# Patient Record
Sex: FEMALE
[De-identification: names, ages, dates, MRNs, and addresses within clinical notes are randomized per-mention and may not be internally consistent; named-entity substitution may affect disease eponyms.]

---

## 2017-05-02 ENCOUNTER — HOSPITAL ENCOUNTER (EMERGENCY)
Dept: HOSPITAL 14 - H.ER | Age: 22
Discharge: HOME | End: 2017-05-02
Payer: MEDICAID

## 2017-05-02 VITALS
TEMPERATURE: 98.9 F | HEART RATE: 94 BPM | DIASTOLIC BLOOD PRESSURE: 69 MMHG | SYSTOLIC BLOOD PRESSURE: 137 MMHG | OXYGEN SATURATION: 98 %

## 2017-05-02 VITALS — BODY MASS INDEX: 38.2 KG/M2

## 2017-05-02 VITALS — RESPIRATION RATE: 18 BRPM

## 2017-05-02 DIAGNOSIS — N76.0: Primary | ICD-10-CM

## 2017-05-02 NOTE — ED PDOC
HPI: Female  Pain


Time Seen by Provider: 17 10:06


Chief Complaint (Nursing): Female Genitourinary


Chief Complaint (Provider): vaginal discomfort


History Per: Patient


Additional Complaint(s): 


21-year-old female presents to emergency department with external vaginal 

discomfort 2 days. Patient has generalized itchiness externally and denies any 

discharge. Patient states she is currently sexually active with one partner and 

always uses protection. She denies any concern for STDs. No fever or chills. 

Patient denies dysuria, hematuria, frequency or urgency.





Past Medical History


Reviewed: Historical Data, Nursing Documentation, Vital Signs


Vital Signs: 





 Last Vital Signs











Temp  98.9 F   17 10:13


 


Pulse  94 H  17 10:13


 


Resp  18   17 10:13


 


BP  137/69   17 10:13


 


Pulse Ox  98   17 10:13














- Medical History


PMH: Asthma





- Surgical History


Surgical History: Tonsillectomy





- Family History


Family History: States: No Known Family Hx





- Living Arrangements


Living Arrangements: With Family





- Social History


Current smoker - smoking cessation education provided: No


Alcohol: None


Drugs: Denies





- Home Medications


Home Medications: 


 Ambulatory Orders











 Medication  Instructions  Recorded


 


Fluconazole [Diflucan] 150 mg PO ONCE #1 tab 17


 


Metronidazole [Metrogel] 60 gm VAG HS #1 packet 17














- Allergies


Allergies/Adverse Reactions: 


 Allergies











Allergy/AdvReac Type Severity Reaction Status Date / Time


 


No Known Allergies Allergy   Verified 17 10:13














Review of Systems


ROS Statement: Except As Marked, All Systems Reviewed And Found Negative


Constitutional: Negative for: Fever


Respiratory: Negative for: Cough


Gastrointestinal: Negative for: Abdominal Pain


Genitourinary Female: Positive for: Other (external vaginal itching )





Physical Exam





- Reviewed


Nursing Documentation Reviewed: Yes


Vital Signs Reviewed: Yes





- Physical Exam


Appears: Positive for: Well, Non-toxic, No Acute Distress


Skin: Negative for: Rash


Eye Exam: Positive for: Normal appearance, EOMI, PERRL


Cardiovascular/Chest: Positive for: Regular Rate, Rhythm


Respiratory: Positive for: Normal Breath Sounds


Pelvic Exam: Positive for: Other (Slight erythema noted to labia majora 

bilaterally, no lesions or rash, no active discharge noted)


Back: Negative for: L CVA Tenderness, R CVA Tenderness


Extremity: Negative for: Pedal Edema


Neurologic/Psych: Positive for: Alert, Oriented





- Laboratory Results


Urine Pregnancy POC: Negative


Urine dip results: Positive for: Leukocyte Esterase (trace), Blood (moderate).  

Negative for: Nitrate, Ketones, Glucose, Bilirubin, Protein





- ECG


O2 Sat by Pulse Oximetry: 98


Pulse Ox Interpretation: Normal





Medical Decision Making


Medical Decision Makin year old with external vaginal discomfort. 





Plan:


Urine dip


Pregnancy test


Urine culture


CHL/GC cultures





Patient will be treated with a prescription for Diflucan and MetroGel. Cultures 

were sent. Patient was advised to take meds as directed and was referred to 

women's clinic for follow-up.





Disposition





- Clinical Impression


Clinical Impression: 


 Vaginal pruritus, Vaginitis








- Patient ED Disposition


Is Patient to be Admitted: No


Counseled Patient/Family Regarding: Diagnosis, Need For Followup, Rx Given





- Disposition


[facility]errals[facility] 


[facility] [[facility]


Disposition: Routine/Home


Disposition Time: 10:40


Condition: STABLE


Additional Instructions: 


Take prescription meds as directed. Follow-up with private gynecologist for 

women's clinic.


Prescriptions: 


Fluconazole [Diflucan] 150 mg PO ONCE #1 tab


Metronidazole [Metrogel] 60 gm VAG HS #1 packet


Instructions:  Vaginitis (ED)

## 2017-05-17 ENCOUNTER — HOSPITAL ENCOUNTER (EMERGENCY)
Dept: HOSPITAL 14 - H.ER | Age: 22
Discharge: HOME | End: 2017-05-17
Payer: MEDICAID

## 2017-05-17 VITALS
DIASTOLIC BLOOD PRESSURE: 87 MMHG | OXYGEN SATURATION: 99 % | RESPIRATION RATE: 20 BRPM | TEMPERATURE: 98.3 F | HEART RATE: 107 BPM | SYSTOLIC BLOOD PRESSURE: 149 MMHG

## 2017-05-17 VITALS — BODY MASS INDEX: 31.7 KG/M2

## 2017-05-17 DIAGNOSIS — Z71.6: ICD-10-CM

## 2017-05-17 DIAGNOSIS — J02.9: Primary | ICD-10-CM

## 2017-05-17 DIAGNOSIS — J06.9: ICD-10-CM

## 2017-05-17 DIAGNOSIS — J45.909: ICD-10-CM

## 2017-05-17 NOTE — ED PDOC
HPI: CCC, URI, Sore Throat


Time Seen by Provider: 05/17/17 11:52


Chief Complaint (Provider): Sore throat


Additional Complaint(s): 





21 yo female, no PMH, presents to ED with complaints of sore throat and dry 

cough since Sunday night. No fever or chills. no nausea or vomiting.





Past Medical History


Reviewed: Historical Data, Nursing Documentation, Vital Signs


Vital Signs: 


 Last Vital Signs











Temp  98.3 F   05/17/17 11:55


 


Pulse  107 H  05/17/17 11:55


 


Resp  20   05/17/17 11:55


 


BP  149/87   05/17/17 11:55


 


Pulse Ox  99   05/17/17 12:06














- Medical History


PMH: Asthma





- Surgical History


Surgical History: Tonsillectomy





- Family History


Family History: States: No Known Family Hx





- Living Arrangements


Living Arrangements: With Family





- Social History


Current smoker - smoking cessation education provided: No





- Immunization History


Hx Tetanus Toxoid Vaccination: No





- Home Medications


Home Medications: 


 Ambulatory Orders











 Medication  Instructions  Recorded


 


Fluconazole [Diflucan] 150 mg PO ONCE #1 tab 05/02/17


 


Metronidazole [Metrogel] 60 gm VAG HS #1 packet 05/02/17


 


Guaifenesin/Pseudoephedrne HCl 1 tab PO DAILY PRN #30 ter 05/17/17





[Mucinex D 600 mg-60 mg]  


 


Ibuprofen [Motrin] 600 mg PO Q6 #20 tab 05/17/17














- Allergies


Allergies/Adverse Reactions: 


 Allergies











Allergy/AdvReac Type Severity Reaction Status Date / Time


 


Pineapple Allergy  RASH Uncoded 05/17/17 12:00














Review of Systems


ROS Statement: Except As Marked, All Systems Reviewed And Found Negative


ENT: Positive for: Throat Pain





Physical Exam





- Reviewed


Nursing Documentation Reviewed: Yes


Vital Signs Reviewed: Yes





- Physical Exam


Appears: Positive for: Well, Non-toxic, No Acute Distress


Head Exam: Positive for: ATRAUMATIC, NORMAL INSPECTION, NORMOCEPHALIC


Skin: Positive for: Normal Color, Warm, DRY


Eye Exam: Positive for: EOMI, Normal appearance, PERRL


ENT: Positive for: TM Is/Are (WNL), Pharyngeal Erythema.  Negative for: 

Tonsillar Exudate, Tonsillar Swelling


Neck: Positive for: Normal, Painless ROM


Cardiovascular/Chest: Positive for: Regular Rate, Rhythm


Respiratory: Positive for: CNT, Normal Breath Sounds


Gastrointestinal/Abdominal: Positive for: Normal Exam, Bowel Sounds, Soft


Back: Positive for: Normal Inspection


Extremity: Positive for: Normal ROM


Neurologic/Psych: Positive for: Alert, Oriented





- ECG


O2 Sat by Pulse Oximetry: 99





Medical Decision Making


Medical Decision Making: 





Strep (-)





Disposition





- Clinical Impression


Clinical Impression: 


 Pharyngitis, Upper respiratory infection








- Patient ED Disposition


Is Patient to be Admitted: No





- Disposition


Disposition: Routine/Home


Disposition Time: 14:07


Condition: STABLE


Prescriptions: 


Guaifenesin/Pseudoephedrne HCl [Mucinex D 600 mg-60 mg] 1 tab PO DAILY PRN #30 

ter


 PRN Reason: congestion


Ibuprofen [Motrin] 600 mg PO Q6 #20 tab


Instructions:  Pharyngitis (ED), Upper Respiratory Infection (ED)





- POA


Present On Arrival: None

## 2017-10-03 ENCOUNTER — HOSPITAL ENCOUNTER (EMERGENCY)
Dept: HOSPITAL 14 - H.ER | Age: 22
Discharge: HOME | End: 2017-10-03
Payer: MEDICAID

## 2017-10-03 VITALS — BODY MASS INDEX: 31.7 KG/M2

## 2017-10-03 VITALS
RESPIRATION RATE: 16 BRPM | OXYGEN SATURATION: 100 % | DIASTOLIC BLOOD PRESSURE: 72 MMHG | TEMPERATURE: 98.6 F | SYSTOLIC BLOOD PRESSURE: 105 MMHG | HEART RATE: 101 BPM

## 2017-10-03 DIAGNOSIS — H60.92: Primary | ICD-10-CM

## 2017-10-03 NOTE — ED PDOC
HPI: CCC, URI, Sore Throat


Time Seen by Provider: 10/03/17 14:40


Chief Complaint (Nursing): ENT Problem


Chief Complaint (Provider): Left ear pain


History Per: Patient


History/Exam Limitations: no limitations


Have you had recent travel within the past 21 days to any of the following 

countries: [place], [place], [place] or [place]?: No


Onset/Duration Of Symptoms: Days


Current Symptoms Are (Timing): Still Present


Location Of Pain: Ear(s)


Sick Contacts (Context): None


Additional History Per: Patient


Additional Complaint(s): 


Patient is a 23 y/o female with history of asthma who presents to the ED for 

evaluation of left ear pain. Patient reports sensation of liquid in her ear but 

denies any drainage or bleeding. Patient denies any associated throat pain, 

cough, congestion. She denies pain in her right ear and offers no other medical 

complaints. No hearing loss or change. 








Past Medical History


Reviewed: Historical Data, Nursing Documentation, Vital Signs


Vital Signs: 


 Last Vital Signs











Temp  98.6 F   10/03/17 14:33


 


Pulse  101 H  10/03/17 14:33


 


Resp  16   10/03/17 14:33


 


BP  105/72   10/03/17 14:33


 


Pulse Ox  100   10/03/17 14:50














- Medical History


PMH: Asthma





- Surgical History


Surgical History: Tonsillectomy





- Family History


Family History: States: No Known Family Hx





- Living Arrangements


Living Arrangements: With Family





- Social History


Current smoker - smoking cessation education provided: No


Ex-Smoker (has not smoked in the last 12 months): No


Alcohol: None





- Immunization History


Hx Tetanus Toxoid Vaccination: No





- Home Medications


Home Medications: 


 Ambulatory Orders











 Medication  Instructions  Recorded


 


Fluconazole [Diflucan] 150 mg PO ONCE #1 tab 05/02/17


 


Metronidazole [Metrogel] 60 gm VAG HS #1 packet 05/02/17


 


Guaifenesin/Pseudoephedrne HCl 1 tab PO DAILY PRN #30 ter 05/17/17





[Mucinex D 600 mg-60 mg]  


 


Ibuprofen [Motrin] 600 mg PO Q6 #20 tab 05/17/17


 


Neomycin/Polymyxin/Hydrocort 4 drop TOP BID #1 bottle 10/03/17





[Cortisporin Otic Soln]  














- Allergies


Allergies/Adverse Reactions: 


 Allergies











Allergy/AdvReac Type Severity Reaction Status Date / Time


 


P[place]apple Allergy  RASH Uncoded 10/03/17 14:32














Review of Systems


ROS Statement: Except As Marked, All Systems Reviewed And Found Negative


Constitutional: Negative for: Fever


ENT: Positive for: Ear Pain (left ear pain).  Negative for: Ear Discharge, Nose 

Congestion, Throat Pain


Respiratory: Negative for: Cough


Neurological: Negative for: Headache, Dizziness





Physical Exam





- Reviewed


Nursing Documentation Reviewed: Yes


Vital Signs Reviewed: Yes





- Physical Exam


Appears: Positive for: Non-toxic, No Acute Distress


Head Exam: Positive for: ATRAUMATIC


Skin: Positive for: Warm, Dry


Eye Exam: Positive for: Normal appearance


ENT: Positive for: TM Is/Are (right TM normal, left TM with poor visualization 

due to edema and erythema in left ear canal. no drainage noted.).  Negative for

: Pharyngeal Erythema, Tonsillar Exudate, Tonsillar Swelling


Neck: Positive for: Supple


Cardiovascular/Chest: Positive for: Regular Rate, Rhythm


Respiratory: Positive for: Normal Breath Sounds.  Negative for: Respiratory 

Distress


Neurologic/Psych: Positive for: Alert, Oriented.  Negative for: Motor/Sensory 

Deficits





- Laboratory Results


Urine Pregnancy POC: Negative





- ECG


O2 Sat by Pulse Oximetry: 100 (RA)


Pulse Ox Interpretation: Normal





Medical Decision Making


Medical Decision Making: 


Time: 1443





Impression: Otitis externa





Plan:


-- UPreg


-- Motrin 600 mg PO





Patient to be discharged home with prescriptions for cortisporin ear drops as 

well as referral to an ENT specialist for a follow up within the next week.





Scribe Attestation:


Documented by Cara Moser acting as a scribe for ALVIN De León


Provider Attestation:


All medical record entries made by the Scribe were at my direction and 

personally dictated by me. I have reviewed the chart and agree that the record 

accurately reflects my personal performance of the history, physical exam, 

medical decision making, and the department course for this patient. I have 

also personally directed, reviewed, and agree with the discharge instructions 

and disposition.








Disposition





- Clinical Impression


Clinical Impression: 


 Otitis externa








- Patient ED Disposition


Is Patient to be Admitted: No


Counseled Patient/Family Regarding: Diagnosis, Need For Followup, Rx Given





- Disposition


Referrals: 


Behin,Babak, MD [Staff Provider] - 


Disposition: Routine/Home


Disposition Time: 14:57


Condition: STABLE


Additional Instructions: 


Take prescription as directed. Over-the-counter Advil for pain as needed. 

Follow up with ear, nose and throat specialist for any persistent symptoms.


Prescriptions: 


Neomycin/Polymyxin/Hydrocort [Cortisporin Otic Soln] 4 drop TOP BID #1 bottle


Instructions:  Otitis Externa (ED)


Forms:  [facility] (English)

## 2017-11-14 ENCOUNTER — HOSPITAL ENCOUNTER (EMERGENCY)
Dept: HOSPITAL 14 - H.ER | Age: 22
Discharge: HOME | End: 2017-11-14
Payer: MEDICAID

## 2017-11-14 VITALS — RESPIRATION RATE: 20 BRPM

## 2017-11-14 VITALS
TEMPERATURE: 99 F | DIASTOLIC BLOOD PRESSURE: 80 MMHG | SYSTOLIC BLOOD PRESSURE: 141 MMHG | HEART RATE: 96 BPM | OXYGEN SATURATION: 99 %

## 2017-11-14 VITALS — BODY MASS INDEX: 37.5 KG/M2

## 2017-11-14 DIAGNOSIS — J45.909: ICD-10-CM

## 2017-11-14 DIAGNOSIS — J06.9: Primary | ICD-10-CM

## 2017-11-14 NOTE — ED PDOC
HPI: CCC, URI, Sore Throat


Time Seen by Provider: 11/14/17 12:14


Chief Complaint (Nursing): Cough, Cold, Congestion


Chief Complaint (Provider): Cough, Congestion


History Per: Patient


History/Exam Limitations: no limitations


Onset/Duration Of Symptoms: Days (x5)


Current Symptoms Are (Timing): Still Present


Additional Complaint(s): 





Qi Le is a 22 year old female with a past medical history of asthma 

who presents to the ED complaining of a dry cough x5 days. Patient states the 

cough began Friday and attributed it to her asthma, leading her to use her 

inhaler. States cough intensified in the afternoon and she took cough medicine 

with no relief. Confirms chest tightness, nausea, subjective fever, and nasal 

congestion. Denies vomiting. 





PMD: Non-[facility] Provider








Past Medical History


Reviewed: Historical Data, Nursing Documentation, Vital Signs


Vital Signs: 


 Last Vital Signs











Temp  99 F   11/14/17 12:12


 


Pulse  96 H  11/14/17 12:12


 


Resp  20   11/14/17 12:12


 


BP  141/80   11/14/17 12:12


 


Pulse Ox  99   11/14/17 15:42














- Medical History


PMH: Asthma


   Denies: Chronic Kidney Disease





- Surgical History


Surgical History: Tonsillectomy





- Family History


Family History: States: Unknown Family Hx





- Social History


Current smoker - smoking cessation education provided: Yes





- Immunization History


Hx Tetanus Toxoid Vaccination: No





- Home Medications


Home Medications: 


 Ambulatory Orders











 Medication  Instructions  Recorded


 


Neomycin/Polymyxin/Hydrocort 4 drop TOP BID #1 bottle 10/03/17





[Cortisporin Otic Soln]  


 


Promethazine/Codeine 5 ml PO DAILY PRN #100 ml 11/14/17





[Codeine/Promethazine 10 MG/5  





Ml-6.25 MG/5 Ml]  


 


Pseudoephedrine [Sudafed Tab] 60 mg PO Q6 PRN #24 tab 11/14/17














- Allergies


Allergies/Adverse Reactions: 


 Allergies











Allergy/AdvReac Type Severity Reaction Status Date / Time


 


Pineapple Allergy  RASH Uncoded 10/03/17 14:32














Review of Systems


ROS Statement: Except As Marked, All Systems Reviewed And Found Negative


Constitutional: Positive for: Fever (subjective)


ENT: Positive for: Nose Congestion


Respiratory: Positive for: Cough.  Negative for: Sputum


Gastrointestinal: Positive for: Nausea.  Negative for: Vomiting





Physical Exam





- Reviewed


Nursing Documentation Reviewed: Yes


Vital Signs Reviewed: Yes





- Physical Exam


Appears: Positive for: Well, Non-toxic, No Acute Distress


Head Exam: Positive for: ATRAUMATIC, NORMAL INSPECTION, NORMOCEPHALIC


Skin: Positive for: Normal Color.  Negative for: Rash


Eye Exam: Positive for: Normal appearance


ENT: Positive for: Nasal Congestion


Respiratory: Positive for: Normal Breath Sounds (Clear)


Neurologic/Psych: Positive for: Alert, Oriented





- ECG


O2 Sat by Pulse Oximetry: 99 (RA)


Pulse Ox Interpretation: Normal





Medical Decision Making


Medical Decision Making: 





Time: 12:22


Clinical Impression: URI


Plan:


Upon provider evaluation patient is medically stable, and requires no further 

treatment in the ED at this time. Patient will be discharged home with Rx for 

Sudafed and Codeine/Promethazine. Counseling was provided and all questions 

were answered regarding diagnosis and need for follow up with PMD. There is 

agreement to discharge plan. Return if symptoms persist or worsen.











--------------------------------------------------------------------------------

-----------------


Scribe Attestation:   


Documented by Ollie Goldsmith, acting as a scribe for Anshul Fleming PA-C





Provider Scribe Attestation:


All medical record entries made by the Scribe were at my direction and 

personally dictated by me. I have reviewed the chart and agree that the record 

accurately reflects my personal performance of the history, physical exam, 

medical decision making, and the department course for this patient. I have 

also personally directed, reviewed, and agree with the discharge instructions 

and disposition.





Disposition





- Clinical Impression


Clinical Impression: 


 URI (upper respiratory infection)








- Patient ED Disposition


Is Patient to be Admitted: No


Counseled Patient/Family Regarding: Diagnosis, Need For Followup, Rx Given





- Disposition


Disposition: Routine/Home


Disposition Time: 12:22


Condition: FAIR


Prescriptions: 


Promethazine/Codeine [Codeine/Promethazine 10 MG/5 Ml-6.25 MG/5 Ml] 5 ml PO 

DAILY PRN #100 ml


 PRN Reason: Cough


Pseudoephedrine [Sudafed Tab] 60 mg PO Q6 PRN #24 tab


 PRN Reason: Nasal Congestion


Instructions:  Upper Respiratory Infection (ED)


Forms:  [facility] (English), [facility] ED School/Work Excuse

## 2018-01-08 ENCOUNTER — HOSPITAL ENCOUNTER (EMERGENCY)
Dept: HOSPITAL 14 - H.ER | Age: 23
Discharge: HOME | End: 2018-01-08
Payer: SELF-PAY

## 2018-01-08 VITALS
SYSTOLIC BLOOD PRESSURE: 160 MMHG | HEART RATE: 112 BPM | OXYGEN SATURATION: 100 % | TEMPERATURE: 97.8 F | RESPIRATION RATE: 16 BRPM | DIASTOLIC BLOOD PRESSURE: 66 MMHG

## 2018-01-08 VITALS — BODY MASS INDEX: 37.5 KG/M2

## 2018-01-08 DIAGNOSIS — J40: Primary | ICD-10-CM

## 2018-01-08 NOTE — ED PDOC
HPI: CCC, URI, Sore Throat


Time Seen by Provider: 01/08/18 13:00


Chief Complaint (Nursing): Cough, Cold, Congestion


Chief Complaint (Provider): Cough


History Per: Patient


History/Exam Limitations: no limitations


Onset/Duration Of Symptoms: Other (3 weeks)


Additional Complaint(s): 





Patient is a 23 y/o female with a past medical history of asthma presenting to 

the emergency department for a productive cough ongoing for three weeks with 

associated occasional shortness of breath. Reports taking Dayquil and Nyquil 

without any significant relief. Denies having an inhaler at home. Reports 

having multiple ill contacts at home (including mom and nephew). Denies fever 

or other complaints.





PCP: none provided.








Past Medical History


Reviewed: Historical Data, Nursing Documentation, Vital Signs


Vital Signs: 


 Last Vital Signs











Temp  97.8 F   01/08/18 12:24


 


Pulse  112 H  01/08/18 12:24


 


Resp  16   01/08/18 12:24


 


BP  160/66 H  01/08/18 12:24


 


Pulse Ox  100   01/08/18 13:20














- Medical History


PMH: Asthma


   Denies: Chronic Kidney Disease





- Surgical History


Surgical History: Tonsillectomy





- Family History


Family History: States: Unknown Family Hx





- Immunization History


Hx Tetanus Toxoid Vaccination: No





- Home Medications


Home Medications: 


 Ambulatory Orders











 Medication  Instructions  Recorded


 


Neomycin/Polymyxin/Hydrocort 4 drop TOP BID #1 bottle 10/03/17





[Cortisporin Otic Soln]  


 


Promethazine/Codeine 5 ml PO DAILY PRN #100 ml 11/14/17





[Codeine/Promethazine 10 MG/5  





Ml-6.25 MG/5 Ml]  


 


Pseudoephedrine [Sudafed Tab] 60 mg PO Q6 PRN #24 tab 11/14/17


 


Albuterol HFA [Ventolin HFA 90 2 puff IH [id number] PRN #1 inh 01/08/18





mcg/actuation (8 g)]  


 


Prednisone [Deltasone] 2 tab PO DAILY #10 tablet 01/08/18


 


Promethazine/Codeine 5 ml PO Q12 PRN #100 ml 01/08/18





[Codeine/Promethazine 10 MG/5  





Ml-6.25 MG/5 Ml]  














- Allergies


Allergies/Adverse Reactions: 


 Allergies











Allergy/AdvReac Type Severity Reaction Status Date / Time


 


P[place]apple Allergy  RASH Uncoded 10/03/17 14:32














Review of Systems


ROS Statement: Except As Marked, All Systems Reviewed And Found Negative


Constitutional: Negative for: Fever


Respiratory: Positive for: Cough





Physical Exam





- Reviewed


Nursing Documentation Reviewed: Yes


Vital Signs Reviewed: Yes





- Physical Exam


Appears: Positive for: Well, Non-toxic, No Acute Distress


Head Exam: Positive for: ATRAUMATIC, NORMAL INSPECTION, NORMOCEPHALIC


Skin: Positive for: Normal Color, Warm, Dry


Eye Exam: Positive for: Normal appearance


Neck: Positive for: Normal, Painless ROM, Supple


Cardiovascular/Chest: Positive for: Regular Rate, Rhythm


Respiratory: Positive for: Normal Breath Sounds (bilaterally).  Negative for: 

Accessory Muscle Use, Respiratory Distress


Extremity: Positive for: Normal ROM.  Negative for: Pedal Edema


Neurologic/Psych: Positive for: Alert, Oriented (x3)





- ECG


O2 Sat by Pulse Oximetry: 100 (RA)


Pulse Ox Interpretation: Normal





Medical Decision Making


Medical Decision Making: 











--------------------------------------------------------------------------------

-----------------


Scribe Attestation:


Documented by Rita Saldaña, acting as a scribe for ALVIN Peck.





Provider Scribe Attestation:


All medical record entries made by the Scribe were at my direction and 

personally dictated by me. I have reviewed the chart and agree that the record 

accurately reflects my personal performance of the history, physical exam, 

medical decision making, and the department course for this patient. I have 

also personally directed, reviewed, and agree with the discharge instructions 

and disposition.





Disposition





- Clinical Impression


Clinical Impression: 


 Bronchitis








- Patient ED Disposition


Is Patient to be Admitted: No





- Disposition


Referrals: 


[facility] [Outside]


Disposition: Routine/Home


Disposition Time: 13:41


Condition: FAIR


Prescriptions: 


Albuterol HFA [Ventolin HFA 90 mcg/actuation (8 g)] 2 puff IH [id number] PRN #1 inh


 PRN Reason: Cough


Prednisone [Deltasone] 2 tab PO DAILY #10 tablet


Promethazine/Codeine [Codeine/Promethazine 10 MG/5 Ml-6.25 MG/5 Ml] 5 ml PO Q12 

PRN #100 ml


 PRN Reason: Cough


Instructions:  Acute Bronchitis (ED)


Forms:  [facility] (English), [facility] ED School/Work Excuse

## 2018-09-04 ENCOUNTER — HOSPITAL ENCOUNTER (EMERGENCY)
Dept: HOSPITAL 31 - C.ER | Age: 23
Discharge: HOME | End: 2018-09-04
Payer: COMMERCIAL

## 2018-09-04 VITALS
RESPIRATION RATE: 18 BRPM | HEART RATE: 122 BPM | TEMPERATURE: 98.8 F | SYSTOLIC BLOOD PRESSURE: 123 MMHG | DIASTOLIC BLOOD PRESSURE: 75 MMHG | OXYGEN SATURATION: 95 %

## 2018-09-04 DIAGNOSIS — T22.211A: Primary | ICD-10-CM
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
## 2018-12-04 ENCOUNTER — HOSPITAL ENCOUNTER (EMERGENCY)
Dept: HOSPITAL 14 - H.ER | Age: 23
Discharge: HOME | End: 2018-12-04
Payer: MEDICAID

## 2018-12-04 VITALS
HEART RATE: 98 BPM | SYSTOLIC BLOOD PRESSURE: 123 MMHG | OXYGEN SATURATION: 100 % | RESPIRATION RATE: 18 BRPM | DIASTOLIC BLOOD PRESSURE: 54 MMHG

## 2018-12-04 VITALS — BODY MASS INDEX: 37.5 KG/M2

## 2018-12-04 VITALS — TEMPERATURE: 99 F

## 2018-12-04 DIAGNOSIS — Z79.899: ICD-10-CM

## 2018-12-04 DIAGNOSIS — J45.901: ICD-10-CM

## 2018-12-04 DIAGNOSIS — J06.9: Primary | ICD-10-CM

## 2018-12-04 NOTE — ED PDOC
HPI: Influenza


Time Seen by Provider: 12/04/18 11:55


Chief Complaint: Cough, Cold, Congestion


Chief Complaint (Provider): Cough


History Per: Patient


Exam Limitations: no limitations


Onset/Duration Of Symptoms: Days (today)


Additional complaint(s):: 





Pt. with cough, runny nose, congestion, wheezes.  1 week onset.  Tried nebs with

minimal relief.  Has chest tightness like her asthma.  No weakness, abd pain, 

back pain, dizzines.  





Past Medical History


Reviewed: Nursing Documentation, Vital Signs


Vital Signs: 


                                Last Vital Signs











Temp  99.0 F   12/04/18 11:51


 


Pulse  116 H  12/04/18 11:51


 


Resp  20   12/04/18 11:51


 


BP  144/80   12/04/18 11:51


 


Pulse Ox  97   12/04/18 11:51














- Medical History


PMH: Asthma


   Denies: Chronic Kidney Disease





- Surgical History


Surgical History: Tonsillectomy





- Family History


Family History: States: Unknown Family Hx





- Immunization History


Hx Tetanus Toxoid Vaccination: No





- Home Medications


Home Medications: 


                                Ambulatory Orders











 Medication  Instructions  Recorded


 


Neomycin/Polymyxin/Hydrocort 4 drop TOP BID #1 bottle 10/03/17





[Cortisporin Otic Soln]  


 


Promethazine/Codeine 5 ml PO DAILY PRN #100 ml 11/14/17





[Codeine/Promethazine 10 MG/5  





Ml-6.25 MG/5 Ml]  


 


Pseudoephedrine [Sudafed Tab] 60 mg PO Q6 PRN #24 tab 11/14/17


 


Albuterol HFA [Ventolin HFA 90 2 puff IH [id number] PRN #1 inh 01/08/18





mcg/actuation (8 g)]  


 


Prednisone [Deltasone] 2 tab PO DAILY #10 tablet 01/08/18


 


Promethazine/Codeine 5 ml PO Q12 PRN #100 ml 01/08/18





[Codeine/Promethazine 10 MG/5  





Ml-6.25 MG/5 Ml]  


 


Albuterol Sulfate [Proair Hfa] 0.09 mg IH Q6H PRN #2 inh 12/04/18


 


Benzonatate [Tessalon Perles] 100 mg PO BID PRN 5 Days  sgl 12/04/18


 


predniSONE [predniSONE Tab] 20 mg PO BID 5 Days  tab 12/04/18














- Allergies


Allergies/Adverse Reactions: 


                                    Allergies











Allergy/AdvReac Type Severity Reaction Status Date / Time


 


P[place]apple Allergy  RASH Uncoded 10/03/17 14:32














Review of Systems


ROS Statement: Except As Marked, All Systems Reviewed And Found Negative


ENT: Positive for: Nose Pain, Nose Discharge, Nose Congestion


Cardiovascular: Positive for: Chest Pain


Respiratory: Positive for: Cough, Wheezing





Physical Exam





- Reviewed


Nursing Documentation Reviewed: Yes


Vital Signs Reviewed: Yes





- Physical Exam


Appears: Positive for: Non-toxic, No Acute Distress


Head Exam: Positive for: ATRAUMATIC, NORMAL INSPECTION, NORMOCEPHALIC


Skin: Positive for: Normal Color, Warm, DRY


Eye Exam: Positive for: EOMI, Normal appearance, PERRL


ENT: Positive for: Nasal Congestion.  Negative for: Pharyngeal Erythema, To

nsillar Exudate


Neck: Positive for: Normal, Painless ROM, Supple


Cardiovascular/Chest: Positive for: Regular Rate, Rhythm


Respiratory: Positive for: Decreased Breath Sounds, Wheezing (b/l)


Gastrointestinal/Abdominal: Positive for: Normal Exam, Soft.  Negative for: 

Tenderness


Back: Positive for: Normal Inspection


Extremity: Positive for: Normal ROM.  Negative for: Tenderness, Pedal Edema


Neurologic/Psych: Positive for: Alert, Oriented





- ECG


O2 Sat by Pulse Oximetry: 97


Pulse Ox Interpretation: Normal





- Progress


ED Course And Treament: 





1349:  Stable.  AAOx3.  Pain free.  Tolerated PO.  Fu with pcp.





Disposition





- Clinical Impression


Clinical Impression: 


 URI (upper respiratory infection), Asthma exacerbation








- Patient ED Disposition


Is Patient to be Admitted: No


Counseled Patient/Family Regarding: Diagnosis, Need For Followup, Rx Given





- Disposition


Referrals: 


[facility] [Outside] - 12/06/18


Disposition: Routine/Home


Disposition Time: 15:05


Condition: STABLE


Additional Instructions: 


Return if not better in 3 days. 


Prescriptions: 


Albuterol Sulfate [Proair Hfa] 0.09 mg IH Q6H PRN #2 inh


 PRN Reason: Wheezing


Benzonatate [Tessalon Perles] 100 mg PO BID PRN 5 Days  sgl


 PRN Reason: Cough


predniSONE [predniSONE Tab] 20 mg PO BID 5 Days  tab


Instructions:  Asthma, Adult (DC), Viral Upper Respiratory Infection, Adult (DC)


Forms:  [employer] (English), [facility] ED School/Work Excuse